# Patient Record
Sex: MALE | Race: WHITE | NOT HISPANIC OR LATINO | Employment: UNEMPLOYED | URBAN - METROPOLITAN AREA
[De-identification: names, ages, dates, MRNs, and addresses within clinical notes are randomized per-mention and may not be internally consistent; named-entity substitution may affect disease eponyms.]

---

## 2022-04-27 ENCOUNTER — HOSPITAL ENCOUNTER (EMERGENCY)
Facility: HOSPITAL | Age: 22
Discharge: HOME | End: 2022-04-27
Attending: EMERGENCY MEDICINE | Admitting: EMERGENCY MEDICINE
Payer: COMMERCIAL

## 2022-04-27 VITALS
RESPIRATION RATE: 16 BRPM | TEMPERATURE: 97.6 F | DIASTOLIC BLOOD PRESSURE: 65 MMHG | OXYGEN SATURATION: 94 % | HEIGHT: 67 IN | HEART RATE: 95 BPM | BODY MASS INDEX: 26.51 KG/M2 | SYSTOLIC BLOOD PRESSURE: 118 MMHG | WEIGHT: 168.87 LBS

## 2022-04-27 DIAGNOSIS — F10.929 ALCOHOL INTOXICATION (HCC): ICD-10-CM

## 2022-04-27 DIAGNOSIS — F19.10 SUBSTANCE ABUSE (HCC): Primary | ICD-10-CM

## 2022-04-27 DIAGNOSIS — R45.1 AGITATION: ICD-10-CM

## 2022-04-27 LAB
ALBUMIN SERPL BCP-MCNC: 4.1 G/DL (ref 3.5–5)
ALP SERPL-CCNC: 96 U/L (ref 46–116)
ALT SERPL W P-5'-P-CCNC: 17 U/L (ref 12–78)
AMPHETAMINES SERPL QL SCN: NEGATIVE
ANION GAP SERPL CALCULATED.3IONS-SCNC: 11 MMOL/L (ref 4–13)
APAP SERPL-MCNC: <2 UG/ML (ref 10–20)
AST SERPL W P-5'-P-CCNC: 14 U/L (ref 5–45)
BARBITURATES UR QL: NEGATIVE
BASOPHILS # BLD AUTO: 0.07 THOUSANDS/ΜL (ref 0–0.1)
BASOPHILS NFR BLD AUTO: 1 % (ref 0–1)
BENZODIAZ UR QL: NEGATIVE
BILIRUB SERPL-MCNC: 0.27 MG/DL (ref 0.2–1)
BUN SERPL-MCNC: 5 MG/DL (ref 5–25)
CALCIUM SERPL-MCNC: 8.6 MG/DL (ref 8.3–10.1)
CHLORIDE SERPL-SCNC: 108 MMOL/L (ref 100–108)
CO2 SERPL-SCNC: 23 MMOL/L (ref 21–32)
COCAINE UR QL: NEGATIVE
CREAT SERPL-MCNC: 0.82 MG/DL (ref 0.6–1.3)
EOSINOPHIL # BLD AUTO: 0.07 THOUSAND/ΜL (ref 0–0.61)
EOSINOPHIL NFR BLD AUTO: 1 % (ref 0–6)
ERYTHROCYTE [DISTWIDTH] IN BLOOD BY AUTOMATED COUNT: 13.1 % (ref 11.6–15.1)
ETHANOL SERPL-MCNC: 222 MG/DL (ref 0–3)
ETHANOL SERPL-MCNC: 94 MG/DL (ref 0–3)
FLUAV RNA RESP QL NAA+PROBE: NEGATIVE
FLUBV RNA RESP QL NAA+PROBE: NEGATIVE
GFR SERPL CREATININE-BSD FRML MDRD: 127 ML/MIN/1.73SQ M
GLUCOSE SERPL-MCNC: 102 MG/DL (ref 65–140)
HCT VFR BLD AUTO: 42 % (ref 36.5–49.3)
HGB BLD-MCNC: 13.9 G/DL (ref 12–17)
IMM GRANULOCYTES # BLD AUTO: 0.03 THOUSAND/UL (ref 0–0.2)
IMM GRANULOCYTES NFR BLD AUTO: 0 % (ref 0–2)
LYMPHOCYTES # BLD AUTO: 4.41 THOUSANDS/ΜL (ref 0.6–4.47)
LYMPHOCYTES NFR BLD AUTO: 50 % (ref 14–44)
MCH RBC QN AUTO: 26.6 PG (ref 26.8–34.3)
MCHC RBC AUTO-ENTMCNC: 33.1 G/DL (ref 31.4–37.4)
MCV RBC AUTO: 81 FL (ref 82–98)
METHADONE UR QL: NEGATIVE
MONOCYTES # BLD AUTO: 0.37 THOUSAND/ΜL (ref 0.17–1.22)
MONOCYTES NFR BLD AUTO: 4 % (ref 4–12)
NEUTROPHILS # BLD AUTO: 3.82 THOUSANDS/ΜL (ref 1.85–7.62)
NEUTS SEG NFR BLD AUTO: 44 % (ref 43–75)
NRBC BLD AUTO-RTO: 0 /100 WBCS
OPIATES UR QL SCN: NEGATIVE
OXYCODONE+OXYMORPHONE UR QL SCN: POSITIVE
PCP UR QL: NEGATIVE
PLATELET # BLD AUTO: 283 THOUSANDS/UL (ref 149–390)
PMV BLD AUTO: 9.3 FL (ref 8.9–12.7)
POTASSIUM SERPL-SCNC: 3.4 MMOL/L (ref 3.5–5.3)
PROT SERPL-MCNC: 7.3 G/DL (ref 6.4–8.2)
RBC # BLD AUTO: 5.22 MILLION/UL (ref 3.88–5.62)
RSV RNA RESP QL NAA+PROBE: NEGATIVE
SALICYLATES SERPL-MCNC: <3 MG/DL (ref 3–20)
SARS-COV-2 RNA RESP QL NAA+PROBE: NEGATIVE
SODIUM SERPL-SCNC: 142 MMOL/L (ref 136–145)
THC UR QL: POSITIVE
WBC # BLD AUTO: 8.77 THOUSAND/UL (ref 4.31–10.16)

## 2022-04-27 PROCEDURE — 99285 EMERGENCY DEPT VISIT HI MDM: CPT | Performed by: EMERGENCY MEDICINE

## 2022-04-27 PROCEDURE — 96361 HYDRATE IV INFUSION ADD-ON: CPT

## 2022-04-27 PROCEDURE — 82077 ASSAY SPEC XCP UR&BREATH IA: CPT | Performed by: EMERGENCY MEDICINE

## 2022-04-27 PROCEDURE — 99285 EMERGENCY DEPT VISIT HI MDM: CPT

## 2022-04-27 PROCEDURE — 0241U HB NFCT DS VIR RESP RNA 4 TRGT: CPT | Performed by: EMERGENCY MEDICINE

## 2022-04-27 PROCEDURE — 80179 DRUG ASSAY SALICYLATE: CPT | Performed by: EMERGENCY MEDICINE

## 2022-04-27 PROCEDURE — 96372 THER/PROPH/DIAG INJ SC/IM: CPT

## 2022-04-27 PROCEDURE — 80307 DRUG TEST PRSMV CHEM ANLYZR: CPT | Performed by: EMERGENCY MEDICINE

## 2022-04-27 PROCEDURE — 80053 COMPREHEN METABOLIC PANEL: CPT | Performed by: EMERGENCY MEDICINE

## 2022-04-27 PROCEDURE — 93005 ELECTROCARDIOGRAM TRACING: CPT

## 2022-04-27 PROCEDURE — 36415 COLL VENOUS BLD VENIPUNCTURE: CPT | Performed by: EMERGENCY MEDICINE

## 2022-04-27 PROCEDURE — 85025 COMPLETE CBC W/AUTO DIFF WBC: CPT | Performed by: EMERGENCY MEDICINE

## 2022-04-27 PROCEDURE — 80143 DRUG ASSAY ACETAMINOPHEN: CPT | Performed by: EMERGENCY MEDICINE

## 2022-04-27 PROCEDURE — 96360 HYDRATION IV INFUSION INIT: CPT

## 2022-04-27 RX ORDER — LORAZEPAM 2 MG/ML
2 INJECTION INTRAMUSCULAR ONCE
Status: COMPLETED | OUTPATIENT
Start: 2022-04-27 | End: 2022-04-27

## 2022-04-27 RX ORDER — HALOPERIDOL 5 MG/ML
5 INJECTION INTRAMUSCULAR ONCE
Status: COMPLETED | OUTPATIENT
Start: 2022-04-27 | End: 2022-04-27

## 2022-04-27 RX ORDER — DIPHENHYDRAMINE HYDROCHLORIDE 50 MG/ML
50 INJECTION INTRAMUSCULAR; INTRAVENOUS ONCE
Status: COMPLETED | OUTPATIENT
Start: 2022-04-27 | End: 2022-04-27

## 2022-04-27 RX ADMIN — DIPHENHYDRAMINE HYDROCHLORIDE 50 MG: 50 INJECTION, SOLUTION INTRAMUSCULAR; INTRAVENOUS at 02:02

## 2022-04-27 RX ADMIN — SODIUM CHLORIDE 1000 ML: 0.9 INJECTION, SOLUTION INTRAVENOUS at 06:29

## 2022-04-27 RX ADMIN — SODIUM CHLORIDE 1000 ML: 0.9 INJECTION, SOLUTION INTRAVENOUS at 05:11

## 2022-04-27 RX ADMIN — HALOPERIDOL LACTATE 5 MG: 5 INJECTION, SOLUTION INTRAMUSCULAR at 02:02

## 2022-04-27 RX ADMIN — LORAZEPAM 2 MG: 2 INJECTION INTRAMUSCULAR; INTRAVENOUS at 02:02

## 2022-04-27 NOTE — ED NOTES
Pt attempting to thrash body off of stretcher, and bed once transferred to ED stretcher  Also attempting to scratch staff with sharpened nails  Patient placed in 4pt restraints for safety of patient as well as staff       Ant Jaramillo RN  04/27/22 7301

## 2022-04-27 NOTE — ED NOTES
Pt attempting to chew through restraints, and attempting to bite both nursing and security staff  Spit guard applied at this time        Regis Tanner RN  04/27/22 4875

## 2022-04-27 NOTE — ED NOTES
Pt is not medically cleared at this time  Pt will be changed once cleared  Room was stripped besides monitor, cables, 1 suction and vac, and one O2       Burnard Flick  04/27/22 0314

## 2022-04-27 NOTE — ED PROVIDER NOTES
History  CC: intoxication  HPI  24 yo M to F presents with alcohol intoxication and drug abuse  Reportedly went to [place] [facility] this evening and was intoxicated and combative, making suicidal statements  Patient unable to give history given intoxication  According to EMS, patient took meth, opioids, alcohol and marijuana tonight  Was combative with EMS who have restrained patient  None       No past medical history on file  No past surgical history on file  No family history on file  I have reviewed and agree with the history as documented  No existing history information found  No existing history information found  Social History     Tobacco Use    Smoking status: Not on file    Smokeless tobacco: Not on file   Substance Use Topics    Alcohol use: Not on file    Drug use: Not on file       Review of Systems   Unable to perform ROS: Mental status change   Psychiatric/Behavioral: Positive for agitation  Physical Exam  Physical Exam  Vitals and nursing note reviewed  Constitutional:       General: He is not in acute distress  Appearance: He is well-developed  He is not diaphoretic  HENT:      Head: Atraumatic  Right Ear: External ear normal       Left Ear: External ear normal       Nose: Nose normal    Eyes:      Conjunctiva/sclera: Conjunctivae normal       Pupils: Pupils are equal, round, and reactive to light  Neck:      Vascular: No JVD  Cardiovascular:      Rate and Rhythm: Normal rate and regular rhythm  Heart sounds: Normal heart sounds  No murmur heard  Pulmonary:      Effort: Pulmonary effort is normal  No respiratory distress  Breath sounds: Normal breath sounds  No wheezing  Abdominal:      General: Bowel sounds are normal  There is no distension  Palpations: Abdomen is soft  Tenderness: There is no abdominal tenderness  Musculoskeletal:         General: Normal range of motion        Cervical back: Normal range of motion and neck supple  Skin:     General: Skin is warm and dry  Capillary Refill: Capillary refill takes less than 2 seconds  Neurological:      Mental Status: He is alert  Cranial Nerves: No cranial nerve deficit  Psychiatric:      Comments: agitated         Vital Signs  ED Triage Vitals [04/27/22 0150]   Temperature Pulse Respirations Blood Pressure SpO2   97 6 °F (36 4 °C) 85 18 122/56 96 %      Temp Source Heart Rate Source Patient Position - Orthostatic VS BP Location FiO2 (%)   Oral Monitor Lying Right arm --      Pain Score       --           Vitals:    04/27/22 0150 04/27/22 0330   BP: 122/56 106/51   Pulse: 85 82   Patient Position - Orthostatic VS: Lying          Visual Acuity      ED Medications  Medications   LORazepam (ATIVAN) injection 2 mg (2 mg Intramuscular Given 4/27/22 0202)   diphenhydrAMINE (BENADRYL) injection 50 mg (50 mg Intramuscular Given 4/27/22 0202)   haloperidol lactate (HALDOL) injection 5 mg (5 mg Intramuscular Given 4/27/22 0202)       Diagnostic Studies  Results Reviewed     Procedure Component Value Units Date/Time    Acetaminophen level-If concentration is detectable, please discuss with medical  on call   [[id number]]  (Abnormal) Collected: 04/27/22 0158    Lab Status: Final result Specimen: Blood from Arm, Left Updated: 04/27/22 0313     Acetaminophen Level <4 4 ug/mL     Salicylate level [[id number]]  (Abnormal) Collected: 04/27/22 0158    Lab Status: Final result Specimen: Blood from Arm, Left Updated: 05/85/81 3451     Salicylate Lvl <3 mg/dL     Rapid drug screen, urine [[id number]]  (Abnormal) Collected: 04/27/22 0249    Lab Status: Final result Specimen: Urine, Catheter Updated: 04/27/22 0309     Amph/Meth UR Negative     Barbiturate Ur Negative     Benzodiazepine Urine Negative     Cocaine Urine Negative     Methadone Urine Negative     Opiate Urine Negative     PCP Ur Negative     THC Urine Positive     Oxycodone Urine Positive    Narrative: Presumptive report  If requested, specimen will be sent to reference lab for confirmation  FOR MEDICAL PURPOSES ONLY  IF CONFIRMATION NEEDED PLEASE CONTACT THE LAB WITHIN 5 DAYS  Drug Screen Cutoff Levels:  AMPHETAMINE/METHAMPHETAMINES  1000 ng/mL  BARBITURATES     200 ng/mL  BENZODIAZEPINES     200 ng/mL  COCAINE      300 ng/mL  METHADONE      300 ng/mL  OPIATES      300 ng/mL  PHENCYCLIDINE     25 ng/mL  THC       50 ng/mL  OXYCODONE      100 ng/mL    COVID/FLU/RSV [[id number]]  (Normal) Collected: 04/27/22 0159    Lab Status: Final result Specimen: Nares from Nasopharyngeal Swab Updated: 04/27/22 0249     SARS-CoV-2 Negative     INFLUENZA A PCR Negative     INFLUENZA B PCR Negative     RSV PCR Negative    Narrative:      FOR PEDIATRIC PATIENTS - copy/paste COVID Guidelines URL to browser: [email or web address]/  [employer]    SARS-CoV-2 assay is a Nucleic Acid Amplification assay intended for the  qualitative detection of nucleic acid from SARS-CoV-2 in nasopharyngeal  swabs  Results are for the presumptive identification of SARS-CoV-2 RNA  Positive results are indicative of infection with SARS-CoV-2, the virus  causing COVID-19, but do not rule out bacterial infection or co-infection  with other viruses  Laboratories within the [place] and its  territories are required to report all positive results to the appropriate  public health authorities  Negative results do not preclude SARS-CoV-2  infection and should not be used as the sole basis for treatment or other  patient management decisions  Negative results must be combined with  clinical observations, patient history, and epidemiological information  This test has not been FDA cleared or approved  This test has been authorized by FDA under an Emergency Use Authorization  (EUA)   This test is only authorized for the duration of time the  declaration that circumstances exist justifying the authorization of the  emergency use of an in vitro diagnostic tests for detection of SARS-CoV-2  virus and/or diagnosis of COVID-19 infection under section 564(b)(1) of  the Act, 21 U  S C  605AKF-4(T)(9), unless the authorization is terminated  or revoked sooner  The test has been validated but independent review by FDA  and CLIA is pending  Test performed using [facility] Gene[facility]pert: This RT-PCR assay targets N2,  a region unique to SARS-CoV-2  A conserved region in the E-gene was chosen  for pan-Sarbecovirus detection which includes SARS-CoV-2      Comprehensive metabolic panel [[id number]]  (Abnormal) Collected: 04/27/22 0158    Lab Status: Final result Specimen: Blood from Arm, Left Updated: 04/27/22 0225     Sodium 142 mmol/L      Potassium 3 4 mmol/L      Chloride 108 mmol/L      CO2 23 mmol/L      ANION GAP 11 mmol/L      BUN 5 mg/dL      Creatinine 0 82 mg/dL      Glucose 102 mg/dL      Calcium 8 6 mg/dL      AST 14 U/L      ALT 17 U/L      Alkaline Phosphatase 96 U/L      Total Protein 7 3 g/dL      Albumin 4 1 g/dL      Total Bilirubin 0 27 mg/dL      eGFR 127 ml/min/1 73sq m     Narrative:      [place] guidelines for Chronic Kidney Disease (CKD):     Stage 1 with normal or high GFR (GFR > 90 mL/min/1 73 square meters)    Stage 2 Mild CKD (GFR = 60-89 mL/min/1 73 square meters)    Stage 3A Moderate CKD (GFR = 45-59 mL/min/1 73 square meters)    Stage 3B Moderate CKD (GFR = 30-44 mL/min/1 73 square meters)    Stage 4 Severe CKD (GFR = 15-29 mL/min/1 73 square meters)    Stage 5 End Stage CKD (GFR <15 mL/min/1 73 square meters)  Note: GFR calculation is accurate only with a steady state creatinine    Ethanol [[id number]]  (Abnormal) Collected: 04/27/22 0158    Lab Status: Final result Specimen: Blood from Arm, Left Updated: 04/27/22 0220     Ethanol Lvl 222 mg/dL     CBC and differential [[id number]]  (Abnormal) Collected: 04/27/22 0158    Lab Status: Final result Specimen: Blood from Arm, Left Updated: 04/27/22 0211     WBC 8 77 Thousand/uL      RBC 5 22 Million/uL      Hemoglobin 13 9 g/dL      Hematocrit 42 0 %      MCV 81 fL      MCH 26 6 pg      MCHC 33 1 g/dL      RDW 13 1 %      MPV 9 3 fL      Platelets 203 Thousands/uL      nRBC 0 /100 WBCs      Neutrophils Relative 44 %      Immat GRANS % 0 %      Lymphocytes Relative 50 %      Monocytes Relative 4 %      Eosinophils Relative 1 %      Basophils Relative 1 %      Neutrophils Absolute 3 82 Thousands/µL      Immature Grans Absolute 0 03 Thousand/uL      Lymphocytes Absolute 4 41 Thousands/µL      Monocytes Absolute 0 37 Thousand/µL      Eosinophils Absolute 0 07 Thousand/µL      Basophils Absolute 0 07 Thousands/µL                  No orders to display              Procedures  ECG 12 Lead Documentation Only    Date/Time: 4/27/2022 2:54 AM  Performed by: Marina Vines MD  Authorized by: Marina Vines MD     Comments:      NSR rate of 78 normal axis and intervals no acute ST elevations or depressions             ED Course                                             MDM  79-year-old male to female presents from recovery center intoxicated, aggressive to staff  Required chemical and physical restraints  Will have crisis evaluate once patient is sober  Disposition  Final diagnoses:   Substance abuse ([facility] [address] )   Agitation     Time reflects when diagnosis was documented in both MDM as applicable and the Disposition within this note     Time User Action Codes Description Comment    4/27/2022  2:33 AM Johnston Neve Add [F19 10] Substance abuse ([address] )     4/27/2022  3:50 AM Johnston Neve Add [R45 1] Agitation       ED Disposition     None      Follow-up Information    None         Patient's Medications    No medications on file       No discharge procedures on file      PDMP Review     None          ED Provider  Electronically Signed by           Marina Vines MD  04/27/22 4515

## 2022-04-27 NOTE — RESTRAINT FACE TO FACE
Restraint Face to Face   Jaylan Minor 23 y o  adult MRN: [id number]  Unit/Bed#: ED 19 Encounter: [id number]      Physical Evaluation agitated  Purpose for Restraints/ Seclusion High risk for self harm, High risk for causing significant disruption of treatment environment  and High risk for harm to others  Patient's reaction to the intervention calmed  Patient's medical condition stable  Patient's Behavioral condition stable  Restraints to be Continued

## 2022-04-27 NOTE — ED NOTES
This writer discussed the patients current presentation and recommended discharge plan with Dr Sarah Anderson  They agree with the patient being discharged at this time with referrals and/or information about OP rehab and detox placement  The patient was provided with referral information for: OP drug and alcohol treatment      This writer and the patient completed a safety plan  The patient was provided with a copy of their safety plan with encouragement to utilize the plan following discharge  In addition, the patient was instructed to call local [facility] crisis, other crisis services, [facility] or to go to the nearest ER immediately if their situation changes at any time  This writer discussed discharge plans with the patient who agrees with and understands the discharge plans           SAFETY PLAN  Warning Signs (thoughts, images, mood, behavior, situations) of a potential crisis: withdrawal from alcohol, seizures, suicidal ideation or self harm      Coping Skills (what can I do to take my mind off the problem, or to keep myself safe): coping skills, AA, exercise     Outside Support (who can I reach out to for support and help)drug and alcohol        National Suicide Prevention Hotline:  1-227.602.7030    [facility] [address]: [facility]: [address] [phone] - Crisis   [phone] - Peer Support Talk Line (1-9pm daily)  [phone] - Teen Support Talk Line (1-9pm daily)  [address] [facility] [address] ([address] [place] [address]) [phone] - [address] [facility]

## 2022-04-27 NOTE — ED NOTES
Update given to Margarita Mcneill at [place] regarding patient status       Dani Freedman RN  04/27/22 4238

## 2022-04-27 NOTE — ED NOTES
Pt arrived to ED with EMS and police escort from [place]  No belongings brought with pt and are assumed to remain at [place]       Sanjuana Arreola RN  04/27/22 9861

## 2022-04-27 NOTE — DISCHARGE INSTRUCTIONS
This writer discussed the patients current presentation and recommended discharge plan with Dr Sarah Anderson  They agree with the patient being discharged at this time with referrals and/or information about OP rehab and detox placement  The patient was provided with referral information for: OP drug and alcohol treatment      This writer and the patient completed a safety plan  The patient was provided with a copy of their safety plan with encouragement to utilize the plan following discharge  In addition, the patient was instructed to call local [facility] crisis, other crisis services, 1 or to go to the nearest ER immediately if their situation changes at any time  This writer discussed discharge plans with the patient who agrees with and understands the discharge plans           SAFETY PLAN  Warning Signs (thoughts, images, mood, behavior, situations) of a potential crisis: withdrawal from alcohol, seizures, suicidal ideation or self harm      Coping Skills (what can I do to take my mind off the problem, or to keep myself safe): coping skills, AA, exercise     Outside Support (who can I reach out to for support and help)drug and alcohol        National Suicide Prevention Hotline:  9-905.141.4591    [facility] [address] [phone] - LV[facility] Crisis/Mobile [address] [facility] [address]: [facility]: [facility] [address] [facility] [address] [place] [address] [address] [phone] - Crisis   [phone] - Peer Support Talk Line (1-9pm daily)  [phone] - Teen Support Talk Line (1-9pm daily)  [address] [facility] [phone] - [address] Crisis       Polysubstance Use Disorder   WHAT YOU NEED TO KNOW: Polysubstance use disorder (PUD) is a medical condition that develops from long-term use or misuse of 2 or more substances  You are not able to stop even though it causes physical or social problems  PUD includes use of a drug such as cocaine or misuse of alcohol, tobacco, or a prescription medicine such as opioids  This disorder is also called polysubstance abuse  DISCHARGE INSTRUCTIONS:   Call your local emergency number (911 in the [address] [facility]) or have someone call if:   · You have chest pain and your heart is beating faster than usual     · You have a seizure  · You feel you might harm yourself or others  · You have new shortness of breath  Seek care immediately if:   · You are dizzy and lightheaded  Call your doctor or therapist if:   · You know or think you are pregnant  · You have questions or concerns about your condition or care  Medicines:   · Withdrawal medicines  may be given according to the substances  Withdrawal can cause serious, life-threatening side effects  Certain medicines can help decrease your withdrawal symptoms and your desire for the substance  Ask for more information about the withdrawal medicines you may need  · Mood stabilizers  may be given to help prevent or treat depression or anxiety caused by substance use and withdrawal     · Take your medicine as directed  Contact your healthcare provider if you think your medicine is not helping or if you have side effects  Tell him or her if you are allergic to any medicine  Keep a list of the medicines, vitamins, and herbs you take  Include the amounts, and when and why you take them  Bring the list or the pill bottles to follow-up visits  Carry your medicine list with you in case of an emergency  Therapy  may be offered in a hospital, outpatient facility, or drug rehabilitation center  Healthcare providers can help you make decisions about treatment programs   The goal is to help you decrease or stop taking the substances  · Cognitive behavioral therapy (CBT)  can help you manage depression and anxiety caused by PUD  CBT can be done with you and a talk therapist or in a group with others  · Motivational enhancement therapy  can help you set and reach healthy, positive goals  · Twelve-step facilitation (TSF)  is a short, structured approach to reach early recovery  It is done one-to-one with a therapist in 12 to 15 sessions  Safety guidelines:   · Do not combine medicines, drugs, or alcohol  The combination can cause an overdose, or cause you to stop breathing  · Learn about the signs of an overdose so you know how to respond  Signs depend on the substances  Your heartbeat or breathing may be faster or slower than usual  You may have heavy sweating, vomiting, trouble sleeping, or sleeping too much  Your skin may be pale or clammy  You may have slurred or slow speech  Tell others about signs of an overdose so they will know what to do if needed  Talk to your healthcare provider about naloxone  You may be able to keep naloxone at home in case of an overdose  Your family and friends can also be trained on how to give it to you if needed  Get immediate help or call your local emergency number (911 in the [address] [place] [address],[address]) for signs of an overdose  · Take prescribed medicines exactly as directed  Do not take more than the recommended amount  Do not take it more often than recommended  If you use a pain patch, be sure to remove the old patch before you place a new one  Make sure the patch is not exposed to sunlight  Sunlight speeds up the opioid release from the patch  · Keep substances out of the reach of children  Store substances in a locked cabinet or in a location that children cannot get to  Follow up with your doctor or therapist as directed:  Write down your questions so you remember to ask them during your visits    For support and more information:   · Alcoholics Anonymous  Web Address: http://www [employer]/    · Substance Abuse and [place] [address] [place] [phone]  Web Address: https://[employer]/    © 244[address] [facility] 2022 Information is for End User's use only and may not be sold, redistributed or otherwise used for commercial purposes  All illustrations and images included in CareNotes® are the copyrighted property of A D A M , Inc  or [facility] Stephanie Eaton   The above information is an  only  It is not intended as medical advice for individual conditions or treatments  Talk to your doctor, nurse or pharmacist before following any medical regimen to see if it is safe and effective for you

## 2022-04-27 NOTE — ED NOTES
Pt woke up, asking why she is here  Pt unaware of how she got here and has no memory of events that took place last night  Pt does admit to taking drugs last night and consuming alcohol  This RN is going to update the crisis worker of the pts status, and ordering lunch tray at this time  Pt is calm and cooperative at this time, will continue to monitor        Pollo Rodriguez RN  00/56/83 6889

## 2022-04-28 LAB
ATRIAL RATE: 78 BPM
ATRIAL RATE: 82 BPM
P AXIS: 134 DEGREES
P AXIS: 37 DEGREES
PR INTERVAL: 110 MS
PR INTERVAL: 128 MS
QRS AXIS: 137 DEGREES
QRS AXIS: 46 DEGREES
QRSD INTERVAL: 88 MS
QRSD INTERVAL: 92 MS
QT INTERVAL: 348 MS
QT INTERVAL: 356 MS
QTC INTERVAL: 405 MS
QTC INTERVAL: 406 MS
T WAVE AXIS: 175 DEGREES
T WAVE AXIS: 3 DEGREES
VENTRICULAR RATE: 78 BPM
VENTRICULAR RATE: 82 BPM

## 2022-04-28 PROCEDURE — 93010 ELECTROCARDIOGRAM REPORT: CPT | Performed by: INTERNAL MEDICINE
